# Patient Record
Sex: MALE | Race: WHITE
[De-identification: names, ages, dates, MRNs, and addresses within clinical notes are randomized per-mention and may not be internally consistent; named-entity substitution may affect disease eponyms.]

---

## 2018-09-20 ENCOUNTER — HOSPITAL ENCOUNTER (EMERGENCY)
Dept: HOSPITAL 62 - ER | Age: 33
LOS: 1 days | Discharge: HOME | End: 2018-09-21
Payer: OTHER GOVERNMENT

## 2018-09-20 DIAGNOSIS — R09.81: ICD-10-CM

## 2018-09-20 DIAGNOSIS — R09.89: ICD-10-CM

## 2018-09-20 DIAGNOSIS — R06.2: ICD-10-CM

## 2018-09-20 DIAGNOSIS — R05: ICD-10-CM

## 2018-09-20 DIAGNOSIS — R50.9: Primary | ICD-10-CM

## 2018-09-20 PROCEDURE — 71046 X-RAY EXAM CHEST 2 VIEWS: CPT

## 2018-09-20 PROCEDURE — 94640 AIRWAY INHALATION TREATMENT: CPT

## 2018-09-20 PROCEDURE — 99285 EMERGENCY DEPT VISIT HI MDM: CPT

## 2018-09-20 NOTE — ER DOCUMENT REPORT
HPI





- HPI


Pain Level: 5


Notes: 








Patient is a 33-year-old male who presents with chief complaint of cough, 

congestion, sinus drainage and generally not feeling well for the last 2 weeks.

  Patient reports his symptoms are getting worse.  Patient states he has a 

history of pneumonia and this feels similar.





- RESPIRATORY


Respiratory: REPORTS: Coughing





- DERM


Skin Color: Normal





Past Medical History





- General


Information source: Patient





- Social History


Smoking Status: Never Smoker


Frequency of alcohol use: None


Drug Abuse: None


Family History: Reviewed & Not Pertinent


Patient has suicidal ideation: No


Patient has homicidal ideation: No





- Past Medical History


Cardiac Medical History: 


   Denies: Hx Heart Attack, Hx Hypertension


Pulmonary Medical History: Reports: Hx Pneumonia


   Denies: Hx Asthma


Neurological Medical History: Denies: Hx Cerebrovascular Accident, Hx Seizures


Renal/ Medical History: Denies: Hx Peritoneal Dialysis


GI Medical History: Denies: Hx Hepatitis, Hx Hiatal Hernia, Hx Ulcer


Infectious Medical History: Denies: Hx Hepatitis


Surgical Hx: Negative


Past Surgical History: Denies: Hx Open Heart Surgery, Hx Pacemaker





- Immunizations


Immunizations up to date: Yes





Vertical Provider Document





- CONSTITUTIONAL


Notes: 





PHYSICAL EXAMINATION:





GENERAL: Well-appearing, well-nourished and in no acute distress.





HEAD: Atraumatic, normocephalic.





EYES: Pupils equal round extraocular movements intact,  conjunctiva are normal.





ENT: Nares patent, tenderness to palpation across frontal and maxillary sinuses

, tympanic membranes unremarkable.





NECK: Normal range of motion





LUNGS: No respiratory distress, mild expiratory wheezing noted.  Bronchospasm 

with deep breaths.  





Musculoskeletal: Normal range of motion





NEUROLOGICAL:  Normal speech, normal gait. 





PSYCH: Normal mood, normal affect.





SKIN: Warm, Dry, normal turgor, no rashes or lesions noted.





- INFECTION CONTROL


TRAVEL OUTSIDE OF THE [place] IN LAST 30 DAYS: No





Course





- Re-evaluation


Re-evalutation: 





Chest x-ray is negative for any acute findings.  Patient does have mild 

wheezing and does appear to be ill.  Considering patient has had these symptoms 

ongoing for 2 weeks now and has a history of pneumonia, I will treat this 

patient with antibiotics.  Patient is agreeable to this plan and understands ED 

return precautions.  Patient will follow up with his primary care provider in 

the next 3-5 days.








- Vital Signs


Vital signs: 


 











Temp Pulse Resp BP Pulse Ox


 


 98.2 F   97   20   144/76 H  98 


 


 09/20/18 20:54  09/20/18 20:54  09/20/18 20:54  09/20/18 20:54  09/20/18 20:54














Discharge





- Discharge


Clinical Impression: 


 Cough, Wheezing





Fever


Qualifiers:


 Fever type: unspecified Qualified Code(s): R50.9 - Fever, unspecified





Condition: Stable


Disposition: HOME, SELF-CARE


Additional Instructions: 





BRONCHITIS WITH BRONCHOSPASM (WHEEZING):





     You have  bronchitis with bronchospasm (wheezing).  Sometimes people 

develop wheezing with a chest cold.  This occurs either because of an 

underlying tendency toward asthma or because the virus itself irritates the 

bronchial tubes.  This irritation causes cough, shortness of breath, and 

wheezing.


     Emergency treatment of bronchospasm may include adrenaline shots or 

bronchodilator aerosol.  You may feel lightheaded and have a rapid pulse for an 

hour or two.  Rest and get plenty of fluids.


     At home, we'll treat you with a bronchodilator inhaler. Corticosteroids 

may be required for some patients. Until you recover, avoid chemical fumes, 

dusts, pollens, and exercising in very cold or dry air. If you smoke, stop now!


     Most cases of bronchitis get better without antibiotics.  We prescribe 

antibiotics when we believe bacteria are damaging your airways, or if there's 

high risk the bronchitis will worsen into pneumonia. Increase your fluid 

intake. A cool mist humidifier may make your lungs more comfortable.  An 

expectorant (cough medicine that loosens phlegm) can help.


     Repeated episodes of bronchitis and bronchospasm may result in lung damage 

-- for example, chronic bronchitis, recurrent pneumonias, or emphysema. If you 

develop a fever, increased wheezing, chest pain, or severe shortness of breath, 

you should contact the doctor immediately.








DECONGESTANT MEDICATION:


     A decongestant medicine has been prescribed.  Often this medicine is 

combined in the same tablet with an antihistamine or expectorant. This type of 

medicine is helpful in treating a bad cold or sinus condition, as well as in 

treatment of the nasal congestion of hay fever.  It is not of much benefit for 

lung infections.


     Decongestant medicines are related to stimulants.  They can cause an 

increase in blood pressure and heart rate.  Persons with heart disease and high 

blood pressure should not take decongestants without discussing this with the 

physician.


     If you develop palpitations, chest pain, headache, or tremors, stop the 

medicine and consult your physician.








COUGH-SUPPRESSANT & EXPECTORANT MEDICATION:


     You are to use a cough medication as needed for relief of symptoms.  This 

medicine is a combination of an expectorant (to make the mucous thinner and 

more easily "coughed up") and a cough suppressant (to reduce the frequency of 

coughing).


     The cough-suppressant medicine is related to narcotics.  You may 

experience mild nausea and sleepiness.  Some patients who are very sensitive to 

narcotics may have stomach pain from this medicine. Taking the medicine with 

food reduces these side effects.  Do not drive or work with machinery until you 

know how this medicine affects you.


     The expectorant should have no side effects.  Iodine-containing 

expectorants (such as organidin) should not be taken by persons with active 

thyroid disease unless approved by your doctor.


     Call the doctor if you develop shortness of breath, hives, rash, itching, 

lightheadedness, or severe nausea and vomiting.








INHALED BRONCHODILATORS:


     You have received a treatment of and/or prescription for an inhaled 

bronchodilator -- a medication which stimulates the airways in the lung to 

dilate.  This improves the flow of air in asthma, bronchitis, and emphysema.


     These medicines have some similarity to adrenaline, and can cause similar 

side effects:  shakiness, racing heart, and a sense of nervousness.  These side 

effects decrease with time.  Contact your doctor if these side effects are 

severe.


     Do not over-use the medicine.  Too-frequent use of the inhaler may make it 

ineffective.  Call your doctor if the inhaler is not controlling your symptoms 

at the prescribed doses.








STEROID MEDICATION:


     You have been given an injection of or oral medicine of the cortisone/

steroid class.  This medication is used to control inflammation or allergy.  

Dario t is usually only given for a short period of time, until the acute process 

subsides.


     There are usually no side effects from short-term use of cortisone-like 

medications.  Some persons feel an increased sense of well-being and are not 

sleepy at bedtime.  Long-term use of cortisone medications is best avoided, 

unless required for a severe condition.  If your condition does not remit, or 

relapses after the course of corticosteroid medication, you should consult your 

physician.








ANTIBIOTIC THERAPY:


     You have been given an antibiotic prescription.  It's important that you 

take all the medication, unless instructed otherwise by your physician.  

Failure to complete the entire course can result in relapse of your condition.


     Common side effects of antibiotics include nausea, intestinal cramping, or 

diarrhea.  Women may develop vaginal yeast infections, and babies can get yeast 

(thrush) in the mouth following the use of antibiotics.  Contact your physician 

if you develop significant side effects from this medication.


     Allergy to this antibiotic can result in hives, wheezing, faintness, or 

itching.  If symptoms of allergy occur, stop the medication and call your 

doctor.








DOXYCYCLINE:


     Doxycycline (Vibramycin, Doryx) is an antibiotic of the tetracycline 

family.  This type of drug is useful for infections of the respiratory tract 

and genital tract, and is sometimes used for intestinal infections.


     Unlike most tetracyclines, doxycycline can be taken with food.  It is 

longer acting, and (usually) less prone to side effects than regular 

tetracycline.


     Tetracycline antibiotics can stain immature teeth and SHOULD NOT BE TAKEN 

BY CHILDREN, NURSING MOTHERS, OR PREGNANT WOMEN.


     Tetracyclines can make you more prone to sunburn.  Abdominal cramping, 

nausea, and diarrhea are occasional side effects.  Women may experience vaginal 

yeast infections.


     Call the doctor at once if you develop hives, itching, shortness of breath

, or lightheadedness.








USE OF ACETAMINOPHEN (Tylenol):


     Acetaminophen may be taken for pain relief or fever control. It's much 

safer than aspirin, offering a wider range of "safe" dosages.  It is safe 

during pregnancy.  Some brand names are Tylenol, Panadol, Datril, Anacin 3, 

Tempra, and Liquiprin. Acetaminophen can be repeated every four hours.  The 

following are maximum recommended dosages:


>89 pounds or adults          650 mg to 900 mg


Acetaminophen can be repeated every four hours.  Maximum dose not to exceed 

4000 mg a day.








SMOKING:


     If you smoke, you should stop smoking.  The tar and chemicals in cigarette 

smoke are harmful.  Smoking has been shown to cause:


          emphysema


          chronic bronchitis


          lung cancer


          mouth and throat cancer


          stomach and pancreas cancer


          premature aging


          birth defects


     In addition, smoking increases ear and lung infections in children of 

smokers.








FOLLOW-UP CARE:


If you have been referred to a physician for follow-up care, call the physician

s office for an appointment as you were instructed or within the next two days.

  If you experience worsening or a significant change in your symptoms, notify 

the physician immediately or return to the Emergency Department at any time for 

re-evaluation.





Prescriptions: 


Benzonatate [Tessalon Perles 100 mg Capsule] 100 mg PO Q8HP PRN #30 capsule


 PRN Reason: 


Doxycycline Hyclate 100 mg PO BID #14 capsule


Prednisone [Deltasone 20 mg Tablet] 3 tab PO DAILY 5 Days #15 tablet

## 2018-09-20 NOTE — RADIOLOGY REPORT (SQ)
EXAM DESCRIPTION: 



XR CHEST 2 VIEWS



COMPLETED DATE/TME:  09/20/2018 21:04



CLINICAL HISTORY: 



33 years, Male, sob, cough



COMPARISON:

None. EXAM DESCRIPTION: 







CLINICAL HISTORY: 



sob, cough



COMPARISON: 



11/6/2017



FINDINGS: 



Two views of the chest are submitted. 



Cardiac silhouette appears normal.



No focal parenchymal or pleural disease.



No acute bony abnormality.



There is no significant pulmonary vascular engorgement.



IMPRESSION: 



No evidence of acute cardiopulmonary disease.

## 2018-09-21 VITALS — SYSTOLIC BLOOD PRESSURE: 131 MMHG | DIASTOLIC BLOOD PRESSURE: 75 MMHG

## 2019-02-12 ENCOUNTER — HOSPITAL ENCOUNTER (EMERGENCY)
Dept: HOSPITAL 62 - ER | Age: 34
Discharge: HOME | End: 2019-02-12
Payer: OTHER GOVERNMENT

## 2019-02-12 VITALS — SYSTOLIC BLOOD PRESSURE: 135 MMHG | DIASTOLIC BLOOD PRESSURE: 71 MMHG

## 2019-02-12 DIAGNOSIS — J11.1: Primary | ICD-10-CM

## 2019-02-12 DIAGNOSIS — R50.9: ICD-10-CM

## 2019-02-12 DIAGNOSIS — F17.290: ICD-10-CM

## 2019-02-12 PROCEDURE — 99283 EMERGENCY DEPT VISIT LOW MDM: CPT

## 2019-02-12 NOTE — ER DOCUMENT REPORT
ED General





- General


Chief Complaint: Cold Symptoms


Stated Complaint: FEVER


Time Seen by Provider: 02/12/19 08:58


Notes: 





Patient presents with 3 days of fever chills sore throat body aches.  Constant. 

Has not taken any medication.  Heart rate went up to 140 with [employer] while he 

was smoking a black and mild yesterday.  He does not have wheezing or shortness 

of breath.  He missed work today.


TRAVEL OUTSIDE OF THE [place] IN LAST 30 DAYS: No





- Related Data


Allergies/Adverse Reactions: 


                                        





No Known Allergies Allergy (Verified 02/12/19 08:40)


   











Past Medical History





- Social History


Smoking Status: Current Every Day Smoker


Smoking Education Provided: Yes - The patient ED visit today was directly 

related to their abuse of tobacco. 


Family History: Reviewed & Not Pertinent





- Past Medical History


Cardiac Medical History: 


   Denies: Hx Heart Attack, Hx Hypertension


Pulmonary Medical History: Reports: Hx Pneumonia


   Denies: Hx Asthma


Neurological Medical History: Denies: Hx Cerebrovascular Accident, Hx Seizures


Renal/ Medical History: Denies: Hx Peritoneal Dialysis


GI Medical History: Denies: Hx Hepatitis, Hx Hiatal Hernia, Hx Ulcer


Infectious Medical History: Denies: Hx Hepatitis


Past Surgical History: Denies: Hx Open Heart Surgery, Hx Pacemaker





- Immunizations


Immunizations up to date: Yes





Review of Systems





- Review of Systems


Notes: 





REVIEW OF SYSTEMS





GEN: fever, chills,


ENT: Denies sore throat, nasal discharge, ear pain


EYES: Denies blurry vision, eye pain, discharge


CV: Denies chest pain, palpitations, edema


RESP: Denies cough, shortness of breath, wheezing


GI: Denies abdominal pain, nausea, vomiting, diarrhea


MSK: Denies joint pain/swelling, edema, myalgia


SKIN: Denies rash, skin lesions


LYMPH: Denies swollen glands/lymph nodes


NEURO: Denies headache, focal weakness or numbness, dizziness


PSYCH: Denies depression, suicidal or homicidal ideation








PHYSICAL EXAMINATION





General: No acute distress, well-nourished


Head: Atraumatic, normocephalic


ENT: Mouth normal, oropharynx moist, no exudates or tonsillar enlargement


Eyes: Conjunctiva normal, pupils equal, lids normal


Neck: No JVD, supple, no guarding


CVS: Normal rate, regular rhythm, no murmurs


Resp: No resp distress, equal and normal breath sounds bilaterally


GI: Nondistended, soft, no tenderness to palpation, no rebound or guarding


Ext: No deformities, no edema, normal range of motion in upper and lower ext


Back: No CVA or midline TTP


Skin: No rash, warm


Lymphatic: No lymphadeopathy noted


Neuro: Awake, alert.  Face symmetric.  GCS 15.





Physical Exam





- Vital signs


Vitals: 


                                        











Temp Pulse Resp BP Pulse Ox


 


 100.3 F   103 H  20   135/71 H  98 


 


 02/12/19 08:43  02/12/19 08:43  02/12/19 08:43  02/12/19 08:43  02/12/19 08:43














Course





- Vital Signs


Vital signs: 


                                        











Temp Pulse Resp BP Pulse Ox


 


 100.3 F   103 H  20   135/71 H  98 


 


 02/12/19 08:43  02/12/19 08:43  02/12/19 08:43  02/12/19 08:43  02/12/19 08:43











02/12/19 09:03


Influenza ibuprofen clinical influenza without apparent pulmonary involvement.  

Instructed on antipyretics.  Well-appearing well-hydrated.  Work note given.  

Tamiflu option discussed.





Discharge





- Discharge


Clinical Impression: 


 Influenza-like illness





Condition: Good


Disposition: HOME, SELF-CARE


Instructions:  Influenza (Critical access hospital)


Additional Instructions: 


Up with primary care in 3-5 days if not improved


Prescriptions: 


Oseltamivir Phosphate [Tamiflu 75 mg Capsule] 75 mg PO BID #10 capsule


Forms:  Return to Work